# Patient Record
Sex: FEMALE | URBAN - NONMETROPOLITAN AREA
[De-identification: names, ages, dates, MRNs, and addresses within clinical notes are randomized per-mention and may not be internally consistent; named-entity substitution may affect disease eponyms.]

---

## 2022-06-13 ENCOUNTER — APPOINTMENT (RX ONLY)
Dept: URBAN - NONMETROPOLITAN AREA CLINIC 11 | Facility: CLINIC | Age: 42
Setting detail: DERMATOLOGY
End: 2022-06-13

## 2022-06-13 DIAGNOSIS — Z72.0 TOBACCO USE: ICD-10-CM

## 2022-06-13 DIAGNOSIS — L72.0 EPIDERMAL CYST: ICD-10-CM

## 2022-06-13 PROCEDURE — ? OTHER

## 2022-06-13 PROCEDURE — ? CONSULTATION EXCISION

## 2022-06-13 PROCEDURE — ? COUNSELING

## 2022-06-13 PROCEDURE — ? SMOKING CESSATION COUNSELING

## 2022-06-13 PROCEDURE — 99202 OFFICE O/P NEW SF 15 MIN: CPT

## 2022-06-13 ASSESSMENT — LOCATION DETAILED DESCRIPTION DERM: LOCATION DETAILED: LEFT MEDIAL ZYGOMA

## 2022-06-13 ASSESSMENT — LOCATION SIMPLE DESCRIPTION DERM: LOCATION SIMPLE: LEFT ZYGOMA

## 2022-06-13 ASSESSMENT — LOCATION ZONE DERM: LOCATION ZONE: FACE

## 2022-06-13 NOTE — PROCEDURE: OTHER
Other (Free Text): Discussed with patient treatment options: plastics vs benign excision with us, discusses in length with patient risk of scarring. Advised pt it may be a good idea to discuss with plastics to have a better outcome of a scar patient declined, would like for us to excise Cyst. Patient is self pay quoted patient: [id number] & [phone] $[phone].\\n\\nI also discussed that patient is a smoker, which smoking history will likely impede wound healing, and thus affect a positive cosmetic outcome of cyst excision. Patient accepted this and still wanted to move forward with cyst excision.
Render Risk Assessment In Note?: no
Detail Level: Zone
Note Text (......Xxx Chief Complaint.): This diagnosis correlates with the